# Patient Record
Sex: FEMALE | Race: BLACK OR AFRICAN AMERICAN | NOT HISPANIC OR LATINO | Employment: UNEMPLOYED | URBAN - METROPOLITAN AREA
[De-identification: names, ages, dates, MRNs, and addresses within clinical notes are randomized per-mention and may not be internally consistent; named-entity substitution may affect disease eponyms.]

---

## 2019-09-16 ENCOUNTER — HOSPITAL ENCOUNTER (OUTPATIENT)
Facility: HOSPITAL | Age: 29
Discharge: HOME | End: 2019-09-16
Attending: OBSTETRICS & GYNECOLOGY | Admitting: OBSTETRICS & GYNECOLOGY

## 2019-09-16 VITALS
DIASTOLIC BLOOD PRESSURE: 74 MMHG | HEART RATE: 91 BPM | HEIGHT: 62 IN | RESPIRATION RATE: 20 BRPM | SYSTOLIC BLOOD PRESSURE: 137 MMHG | BODY MASS INDEX: 40.48 KG/M2 | WEIGHT: 220 LBS | TEMPERATURE: 97.8 F | OXYGEN SATURATION: 100 %

## 2019-09-16 PROBLEM — Z98.891 HISTORY OF CESAREAN DELIVERY: Status: ACTIVE | Noted: 2019-09-16

## 2019-09-16 PROBLEM — Z3A.38 38 WEEKS GESTATION OF PREGNANCY: Status: ACTIVE | Noted: 2019-09-16

## 2019-09-16 PROCEDURE — NS001 PR NO SIGNATURE OR ATTESTATION: Performed by: OBSTETRICS & GYNECOLOGY

## 2019-09-16 PROCEDURE — 99204 OFFICE O/P NEW MOD 45 MIN: CPT

## 2019-09-16 PROCEDURE — 87653 STREP B DNA AMP PROBE: CPT | Performed by: OBSTETRICS & GYNECOLOGY

## 2019-09-16 PROCEDURE — 80307 DRUG TEST PRSMV CHEM ANLYZR: CPT | Performed by: OBSTETRICS & GYNECOLOGY

## 2019-09-17 ENCOUNTER — TELEPHONE (OUTPATIENT)
Dept: OBGYN CLINIC | Facility: CLINIC | Age: 29
End: 2019-09-17

## 2019-09-17 LAB
AMPHETAMINES SERPL QL SCN: NEGATIVE
BARBITURATES UR QL: NEGATIVE
BENZODIAZ UR QL: NEGATIVE
COCAINE UR QL: NEGATIVE
METHADONE UR QL: NEGATIVE
OPIATES UR QL SCN: NEGATIVE
PCP UR QL: NEGATIVE
THC UR QL: NEGATIVE

## 2019-09-17 NOTE — PROGRESS NOTES
L&D Triage Note - OB/GYN  Gisell Jimenez 34 y o  female MRN: [id number]  Unit/Bed#: LD Triage 3-01 Encounter: [id number]      ASSESSMENT:    Gisell Jimenez is a 34 y o   at 38w5d presenting with irregular contractions    PLAN:    1) Contractions   -Complaining of irregular contractions at home   -FHT:  145 bpm/ Moderate 6 - 25 bpm / 15 x 15 accelerations present, no decelerations   -[place]: contractions irregular   -SVE: closed/thick/high    2) Leakage of fluid   -Speculum: cervical os visibly closed   -KOH/ Wet mount: negative   -Nitrazine/Ferning: negative    3) GBS and UDS collected    4) Continue routine prenatal care  5) Discharge from [facility] triage with term labor precautions    - Reviewed rupture of membranes, false vs true labor, decreased fetal movement, and vaginal bleeding   - Pt to call [phone]  in [facility] tomorrow to be seen in the office this week    - Case discussed with Dr Bob Blanton:    Gisell Jimenez 34 y o  [place] at 38w5d with an Estimated Date of Delivery: 19 presenting to triage with contractions and small leakage of fluid for the last few hours  She just moved here from [place] two weeks ago  She claims to have no complications with this pregnancy  She states that she was previously being seen at [facility] in [facility]  She does not have her records on her, she signed a release of records form  She has a hx of a  section 6 years ago for non reassuring heart tones during labor       Her current obstetrical history is significant for no complications that she knows of    Her past obstetrical history is significant for Cesaerean delivery 6 years ago for nonreassuring fetal heart tones    Contractions: none  Leakage of fluid: none  Vaginal Bleeding: none  Fetal movement: present    OBJECTIVE:    Vitals:    19 2241   BP: 137/74   Pulse: 91   Resp: 20   Temp: 97 8 °F (36 6 °C)   SpO2:        ROS:  Constitutional: Negative  Respiratory: Negative  Cardiovascular: Negative    Gastrointestinal: Negative    General Physical Exam:  General: in no apparent distress and well developed and well nourished  Cardiovascular: Cor RRR and No murmurs  Lungs: non-labored breathing  Abdomen: abdomen is soft without significant tenderness, masses, organomegaly or guarding  Lower extremeties: nontender    Cervical Exam  Speculum: Cervical os is closed, scant discharge, no pooling  SVE: 0 / 50% / -4    Fetal monitoring:  FHT:  145 bpm/ Moderate 6 - 25 bpm / 15 x 15 accelerations present, no decelerations  [place]: contractions irregular    KOH/WTMT:     Infection:   - no clue cells    - no hyphae   - no trichomonads present    Membrane status   - no ferning   - no nitrazene   - no pooling     Zhou Michael MD  OBGYN PGY-1  9/16/2019 11:22 PM

## 2019-09-17 NOTE — TELEPHONE ENCOUNTER
----- Message from Arya Edwards MD sent at 2019 11:30 PM EDT -----  Regarding: Establish prenatal care 38 weeks  Duke Pichardo is 38w5d  who just moved here from [place]  She was seen in triage for rule out labor  She needs to be seen in the office this week  She has signed a release of records while in triage  She desires a TOLAC (hx of  6 years ago for nonreassuring heart tones) The best number to reach her is [phone]  Please call in the morning to get her an appointment this week    Thanks,   Neel Collins

## 2019-09-17 NOTE — TELEPHONE ENCOUNTER
Spoke with pt in regards to scheduling a PN appt  Pt declined a visit today, "my mom has appointments today " Scheduled for tomorrow at 1 pm with Dr Cheril Mortimer

## 2019-09-18 ENCOUNTER — ROUTINE PRENATAL (OUTPATIENT)
Dept: OBGYN CLINIC | Facility: CLINIC | Age: 29
End: 2019-09-18

## 2019-09-18 ENCOUNTER — PATIENT OUTREACH (OUTPATIENT)
Dept: OBGYN CLINIC | Facility: CLINIC | Age: 29
End: 2019-09-18

## 2019-09-18 VITALS
HEIGHT: 62 IN | DIASTOLIC BLOOD PRESSURE: 69 MMHG | HEART RATE: 86 BPM | BODY MASS INDEX: 41.59 KG/M2 | WEIGHT: 226 LBS | SYSTOLIC BLOOD PRESSURE: 120 MMHG

## 2019-09-18 DIAGNOSIS — Z3A.39 39 WEEKS GESTATION OF PREGNANCY: Primary | ICD-10-CM

## 2019-09-18 DIAGNOSIS — Z78.9 NEED FOR FOLLOW-UP BY SOCIAL WORKER: Primary | ICD-10-CM

## 2019-09-18 LAB — GP B STREP DNA SPEC QL NAA+PROBE: NORMAL

## 2019-09-18 PROCEDURE — 99213 OFFICE O/P EST LOW 20 MIN: CPT | Performed by: OBSTETRICS & GYNECOLOGY

## 2019-09-18 RX ORDER — FAMOTIDINE 20 MG
1 TABLET ORAL DAILY
Qty: 30 EACH | Refills: 0
Start: 2019-09-18

## 2019-09-18 NOTE — PROGRESS NOTES
BRUNILDA met with 35 y/o- S- G3:P1:AB1- English speaking woman for psychosocial assessment  Pt reported she relocated from [place] few weeks ago  She was receiving prenatal care in [place] but did not brought her record  Pt presents overwhelmed because she is due in a week and does not know what will happened if records don't come on time  Pt relocated because her mom had a heart attack and wanted to move to a quieter and safer place  Pt denies any usage of drug, alcohol or smoking  No mental health history  Pt has [place] MA  Was advice to close the case there to apply for ADRIANNA ALVAREZ  Pt verbalized understanding  Pt reported had a case with [facility] that was closed  SW was able  to confirm information with JAYLA BLACKWOOD [facility] office  Pt states FOB is involved, currently working in [facility] where they plan to relocate together in the near future  Pt reports having needed items for the unborn baby  SW provided supportive counseling  Pt will try to go tomorrow to [place] for her medical records  No other concern at this time

## 2019-09-18 NOTE — PROGRESS NOTES
OB/GYN  Late transfer PN visit  Gisell Dose  2019  2:12 PM  Dr Josiah Powell MD      SUBJECTIVE  Patient is a [id number] at 39w0d per patient here for late transfer of care  This is an unintended and desired pregnancy  FOB is not present with patient  She is currently doing well and has no current complaints  She worked at [employer] but states that she is currently on maternity leave  She denies hx of STD/STI, denies a hx of TB or close contacts with persons with TB  She denies a family history of inheritable conditions such as physical or intellectual disabilities, birth defects, blood disorders, heart or neural tube defects  She denies had MRSA  She denies recent travel or travel planned in the near future  She denies use of nicotine or recreational drug use  She denies vaginal bleeding, cramping, leakage, abnormal discharge  Patient is a late transfer of care from [address]  Patient reports that she has received full prenatal care at [facility] including blood work, MD visits, and imaging studies  She reports that this pregnancy has been uncomplicated - denies any hx of hypertensive disease, diabetes, cardiovascular/hematologic/respiratory issues  She states that she has a history of  delivery during last pregnancy "because the baby's heart rate went down," but she desires TOLAC  Patient recently moved to [place] because "her mom moved here" and she did not bring any prenatal records with her; she was evaluated in OB triage [facility] 2 days ago on 19 for contractions and fluid leakage - she was found to be closed/thick/high with a negative ROM workup  At this time, she signed a release of medical records form for her obstetric care in [place]  During this visit today, prior medical records are not in the system   Patient was found to be tearful during initial evaluation, she states that she wanted to remain and deliver at [facility] but needed to come her for her mother  OB History    Para Term  AB Living   2 1 1 0 0 1   SAB TAB Ectopic Multiple Live Births   0 0 0 0 1      # Outcome Date GA Lbr Hakeem/2nd Weight Sex Delivery Anes PTL Lv   2 Current            1 Term 13     CS-Unspec  N PALOMO       Review of Systems   Constitutional: Negative for chills, diaphoresis, fatigue and fever  HENT: Negative  Eyes: Negative for photophobia, pain, redness and visual disturbance  Respiratory: Negative for cough, choking, shortness of breath, wheezing and stridor  Cardiovascular: Negative for chest pain, palpitations and leg swelling  Gastrointestinal: Negative for abdominal distention, abdominal pain, constipation, diarrhea, nausea and vomiting  Genitourinary: Negative for dysuria, flank pain, genital sores, hematuria, pelvic pain, urgency, vaginal bleeding, vaginal discharge and vaginal pain  Musculoskeletal: Negative  Neurological: Negative for dizziness, seizures, syncope, facial asymmetry, weakness, light-headedness, numbness and headaches         PMH: none    PSH: CS x 1    Social History     Socioeconomic History    Marital status: Unknown     Spouse name: Not on file    Number of children: Not on file    Years of education: Not on file    Highest education level: Not on file   Occupational History    Not on file   Social Needs    Financial resource strain: Not on file    Food insecurity:     Worry: Not on file     Inability: Not on file    Transportation needs:     Medical: Not on file     Non-medical: Not on file   Tobacco Use    Smoking status: Never Smoker    Smokeless tobacco: Never Used   Substance and Sexual Activity    Alcohol use: Not Currently    Drug use: Not Currently    Sexual activity: Not Currently     Partners: Male   Lifestyle    Physical activity:     Days per week: Not on file     Minutes per session: Not on file    Stress: Not on file   Relationships    Social connections:     Talks on phone: Not on file     Gets together: Not on file     Attends [employer] service: Not on file     Active member of club or organization: Not on file     Attends meetings of clubs or organizations: Not on file     Relationship status: Not on file    Intimate partner violence:     Fear of current or ex partner: Not on file     Emotionally abused: Not on file     Physically abused: Not on file     Forced sexual activity: Not on file   Other Topics Concern    Not on file   Social History Narrative    Not on file       OBJECTIVE  /69 (BP Location: Left arm, Patient Position: Sitting, Cuff Size: Standard)   Pulse 86   Ht 5' 2" (1 575 m)   Wt 103 kg (226 lb)   LMP 2018   BMI 41 34 kg/m²    Physical Exam   Constitutional: She is oriented to person, place, and time  She appears well-developed and well-nourished  No distress  Cardiovascular: Normal rate, regular rhythm, normal heart sounds and intact distal pulses  Pulmonary/Chest: Effort normal and breath sounds normal  No stridor  No respiratory distress  She has no wheezes  Abdominal: Soft  Bowel sounds are normal  She exhibits no distension and no mass  There is no tenderness  There is no guarding  Neurological: She is alert and oriented to person, place, and time  She displays normal reflexes  She exhibits normal muscle tone  Skin: Skin is warm and dry  She is not diaphoretic  No erythema  No pallor  ASSESSMENT AND PLAN    34 y o , , with /69 (BP Location: Left arm, Patient Position: Sitting, Cuff Size: Standard)   Pulse 86   Ht 5' 2" (1 575 m)   Wt 103 kg (226 lb)   LMP 2018 , at 39w0d per patient here for her prenatal H&P  FHT 150bpm by dopplers  VTX by handheld US    1  Pregnancy: H&P completed today  PN Labs reviewed today  Labor expectations discussed with patient, including appointment schedule, nutrition, exercise, medications, sexual intercourse, and nausea/vomiting  Patient's BMI is 41 3    2  Screening: Pap smear, GC/CT, and sequential screen results not available at time of visit  3  TOLAC vs Repeat CS: Patient currently desires TOLAC but at this time we do not have her medical records  Discussed with laborist if we can accommodate her wishes -- reported that if we have her medical records by her next visit and that she has a favorable cervix, we could consider scheduling her induction; if we do not have both conditions met, we would recommend repeat  delivery  This was discussed with the patient who agrees with the plan  4  Postpartum: Patient plans on breastfeeding and bottle feeding  Different methods of contraception were discussed with patient, including progesterone only oral pills, depo provera, nexplanon, mirena, and paragard  Patient is currently undecided  5  Follow up: RTC in 1 weeks  Precautions regarding labor, leakage, bleeding, and fetal movement reviewed  6  Case Management evaluation in office after this visit      As we do not have medical records (another form was signed today and faxed) ordered prenatal panel, prenatal vitamin, and MFM referral     Kassie Bridges MD  2019  2:12 PM